# Patient Record
Sex: FEMALE | Race: WHITE
[De-identification: names, ages, dates, MRNs, and addresses within clinical notes are randomized per-mention and may not be internally consistent; named-entity substitution may affect disease eponyms.]

---

## 2017-01-13 ENCOUNTER — HOSPITAL ENCOUNTER (OUTPATIENT)
Dept: HOSPITAL 80 - FIMAGING | Age: 72
End: 2017-01-13
Attending: PHYSICIAN ASSISTANT
Payer: COMMERCIAL

## 2017-01-13 DIAGNOSIS — M99.73: ICD-10-CM

## 2017-01-13 DIAGNOSIS — M48.06: Primary | ICD-10-CM

## 2017-01-13 DIAGNOSIS — M47.896: ICD-10-CM

## 2017-01-13 DIAGNOSIS — M43.16: ICD-10-CM

## 2017-01-13 NOTE — MR
MRI of the Lumbar Spine (Without Contrast)

 

Indication: Low back pain.

 

Technique:    Sagittal and axial T1 and T2 , and sagittal STIR MR sequences of the lumbar spine witho
ut contrast. Axial imaging from T12 through S1.

 

Comparison: MRI lumbar spine September 17, 2010.

 

Findings:  L4-L5 diskogenic Modic changes, 3 mm of anterolisthesis of L4 on L5, and fluid in the L4-L
5 facet joints are all new since 2010. Benign hyperintense hemangiomas in the L1 and L2 vertebral bod
ies are unchanged. No new bone marrow replacing lesion or compression fracture. No pars defect has de
veloped. The paraspinal soft tissues are normal. The abdominal aorta is normal caliber. The thecal sa
c is congenitally capacious. The conus medullaris resides at L1.

 

T12-L1: Widely patent central canal and neural foramina.

 

L1-L2: Minimal facet hypertrophy and ligamentum flavum thickening. Widely patent central canal and ne
ural foramina.

 

L2-L3: Widely patent central canal and neural foramina. Mild facet hypertrophy and ligamentum flavum 
thickening are unchanged.

 

L3-L4: Mild central canal narrowing due to minimal broad-based posterior disk bulge, facet hypertroph
y and ligamentum flavum thickening have not significantly changed since 2010. New minimal fluid has d
eveloped within bilateral facet joints.

 

L4-L5: Mild to moderate central canal narrowing is minimally worse since 2010 due to new anterolisthe
sis of L4 on L5 and diffuse broad-based disk bulge. Mild to moderate bilateral neural foraminal narro
wing is new due to increased facet hypertrophy and grade 1 anterolisthesis. No focal disk herniation.


 

L5-S1: Widely patent central canal. Mild degenerative disk disease and mild facet hypertrophy are unc
hanged resulting in minimal bilateral neural foraminal narrowing.

 

Impression:

1. Progressive degenerative disk and facet arthropathy at the L4-L5 level with new grade 1 anterolist
hesis of L4-L5 and worsening mild to moderate bilateral neural foraminal narrowing. The diskogenic Mo
dic changes and fluid in the facet joints suggest minimal instability at this level.

2. No acute disk herniation or significant central canal narrowing at any level.

## 2017-02-10 ENCOUNTER — HOSPITAL ENCOUNTER (OUTPATIENT)
Dept: HOSPITAL 80 - FLAB | Age: 72
End: 2017-02-10
Attending: FAMILY MEDICINE
Payer: COMMERCIAL

## 2017-02-10 DIAGNOSIS — M25.532: Primary | ICD-10-CM

## 2017-02-10 NOTE — DX
Left Wrist Series,  4 views



Indication: Trauma.



Comparison:  None



Findings:  The bones are anatomically aligned. No acute fracture. Specifically, the navicular bone an
d scapholunate interval are normal. Mild degenerative arthropathy involves the carpal bones evidenced
 by small subcortical cysts.



Impression:  Negative. No acute fracture.

## 2017-05-08 ENCOUNTER — HOSPITAL ENCOUNTER (OUTPATIENT)
Dept: HOSPITAL 80 - BHFA | Age: 72
End: 2017-05-08
Attending: INTERNAL MEDICINE
Payer: COMMERCIAL

## 2017-05-08 DIAGNOSIS — I35.9: Primary | ICD-10-CM

## 2018-02-08 ENCOUNTER — HOSPITAL ENCOUNTER (OUTPATIENT)
Dept: HOSPITAL 80 - BMCIMAGING | Age: 73
End: 2018-02-08
Attending: INTERNAL MEDICINE
Payer: COMMERCIAL

## 2018-02-08 DIAGNOSIS — M77.32: Primary | ICD-10-CM

## 2018-04-25 ENCOUNTER — HOSPITAL ENCOUNTER (OUTPATIENT)
Dept: HOSPITAL 80 - FIMAGING | Age: 73
End: 2018-04-25
Attending: GENERAL ACUTE CARE HOSPITAL
Payer: COMMERCIAL

## 2018-04-25 DIAGNOSIS — R91.8: Primary | ICD-10-CM

## 2018-10-11 ENCOUNTER — HOSPITAL ENCOUNTER (OUTPATIENT)
Dept: HOSPITAL 80 - FIMAGING | Age: 73
End: 2018-10-11
Attending: INTERNAL MEDICINE
Payer: COMMERCIAL

## 2018-10-11 DIAGNOSIS — Z80.3: ICD-10-CM

## 2018-10-11 DIAGNOSIS — Z12.31: Primary | ICD-10-CM
